# Patient Record
Sex: FEMALE | Race: BLACK OR AFRICAN AMERICAN
[De-identification: names, ages, dates, MRNs, and addresses within clinical notes are randomized per-mention and may not be internally consistent; named-entity substitution may affect disease eponyms.]

---

## 2017-08-21 ENCOUNTER — HOSPITAL ENCOUNTER (EMERGENCY)
Dept: HOSPITAL 74 - JERFT | Age: 62
Discharge: HOME | End: 2017-08-21
Payer: COMMERCIAL

## 2017-08-21 VITALS — BODY MASS INDEX: 31.7 KG/M2

## 2017-08-21 VITALS — SYSTOLIC BLOOD PRESSURE: 177 MMHG | TEMPERATURE: 98.2 F | HEART RATE: 78 BPM | DIASTOLIC BLOOD PRESSURE: 85 MMHG

## 2017-08-21 DIAGNOSIS — B02.9: Primary | ICD-10-CM

## 2017-08-21 DIAGNOSIS — I25.10: ICD-10-CM

## 2017-08-21 DIAGNOSIS — E78.00: ICD-10-CM

## 2017-08-21 DIAGNOSIS — I10: ICD-10-CM

## 2017-08-21 DIAGNOSIS — Z95.5: ICD-10-CM

## 2017-08-21 NOTE — PDOC
History of Present Illness





- General


Chief Complaint: Rash


Stated Complaint: RASH/ POSSIBLE SHINGLES


Time Seen by Provider: 08/21/17 12:25


History Source: Patient


Exam Limitations: No Limitations





- History of Present Illness


Initial Comments: 


08/21/17 13:04


Patient is a 62-year-old female history of COPD, reflux, hypertension, high 

cholesterol and cardiac stents. Patient presents emergency department for 

evaluation of painful rash to left lateral torso and her back. Was seen by 

cardiology today and was started on Valtrex however patient came to the 

emergency department because her primary care doctor was unavailable and she 

was not sure that the cardiologist made the right decision on her diagnosis.





Past Medical History: Denies.  





Allergies: No known allergies





Medications: See medication list





Family History: Non-contributory





Social History: Denies smoking, alcohol use, or IVDU





Review of Systems


GENERAL/CONSTITUTIONAL: No fever or chills. No weakness. No weight change.


HEAD, EYES, EARS, NOSE AND THROAT: No change in vision. No ear pain or 

discharge. No sore throat. 


CARDIOVASCULAR: No chest pain or shortness of breath.


RESPIRATORY: No cough, wheezing, or hemoptysis.


GASTROINTESTINAL: No nausea, vomiting, diarrhea or constipation. No rectal 

bleeding.


GENITOURINARY: No dysuria, frequency, or change in urination.


MUSCULOSKELETAL: No joint or muscle swelling or pain. No neck or back pain.


SKIN AND BREASTS: Vesicular, painful annular rash to left lateral torso 

extending to back, dermatomal pattern


NEUROLOGIC: No headache, vertigo, loss of consciousness, or loss of sensation.


PSYCHIATRIC: No depression or anxiety.


ENDOCRINE: No increased thirst. No abnormal weight change.


HEMATOLOGIC/LYMPHATIC: No anemia, easy bleeding, or history of blood clots.


ALLERGIC/IMMUNOLOGIC: No hives or skin allergy. No latex allergy.





Physical Exam: 


GENERAL: The patient is awake, alert, and fully oriented, in no acute distress.


EYES: Pupils equal, round and reactive to light, extraocular movements intact, 

sclera anicteric, conjunctiva clear.


ENT: Ears normal, nares patent, oropharynx clear without exudates.  Moist 

mucous membranes. No uvula deviation


NECK: Normal range of motion, supple without lymphadenopathy, JVD, or masses.


LUNGS: Breath sounds equal, clear to auscultation bilaterally.  No wheezes, and 

no crackles.


HEART: Regular rate and rhythm, normal S1 and S2 without murmur, rub or gallop.


ABDOMEN: Soft, nontender, normoactive bowel sounds.  No guarding, no rebound.  

No masses. No bruising or abrasions


MUSCULOSKELETAL: Normal range of motion, no edema.  No clubbing or cyanosis. No 

cords, erythema, or tenderness. No CVA Tenderness with fist.


NEUROLOGICAL: Cranial nerves II through XII grossly intact.  Normal speech, 

normal gait.


SKIN: Vesicular, painful annular rash to left lateral torso extending to back, 

dermatomal pattern











Past History





- Past Medical History


Allergies/Adverse Reactions: 


 Allergies











Allergy/AdvReac Type Severity Reaction Status Date / Time


 


Penicillins Allergy Severe Hives Verified 08/21/17 11:57











Home Medications: 


Ambulatory Orders





Clopidogrel Bisulfate [Plavix -] 75 mg PO DAILY 04/11/12 


Furosemide [Lasix -] 40 mg PO DAILY 04/11/12 


Lansoprazole [Prevacid -] 30 mg PO DAILY 04/11/12 


Metoprolol Succinate [Toprol XL -] 0 mg PO BID 04/11/12 


Simvastatin [Zocor -] 20 mg PO DAILY 04/11/12 


Aspirin [ASA -] 81 mg PO DAILY 07/04/12 


Lipase/Protease/Amylase [Creon Dr 24,000 Units Capsule] 1 each PO TID 07/04/12 


Loratadine [Claritin] 0 mg PO PRN 07/04/12 








Cardiac Disorders: Yes


COPD: Yes (H/O BRONCHITIS)


GI Disorders: Yes (ACID REFLUX)


HTN: Yes


Hypercholesterolemia: Yes





- Surgical History


Abdominal Surgery: Yes


Cardiac Surgery: Yes (CARDIAC STENT)


Cholecystectomy: Yes





- Psycho/Social/Smoking Cessation Hx


Anxiety: No


Suicidal Ideation: No


Smoking Status: No


Smoking History: Former smoker


Have you smoked in the past 12 months: No


Number of Cigarettes Smoked Daily: 0


Information on smoking cessation initiated: No


'Breaking Loose' booklet given: 06/04/14


Hx Alcohol Use: No


Drug/Substance Use Hx: No


Substance Use Type: None





*Physical Exam





- Vital Signs


 Last Vital Signs











Temp Pulse Resp BP Pulse Ox


 


 98.2 F   78   18   177/85   100 


 


 08/21/17 11:59  08/21/17 11:59  08/21/17 11:59  08/21/17 11:59  08/21/17 11:59














Medical Decision Making





- Medical Decision Making





08/21/17 13:23


A/P: Administration here for evaluation of painful rash to left lateral torso 

and back. Findings are consistent with shingles, patient was placed on 

appropriate medication confirmed after calling the pharmacy. Patient was on 

Valtrex, topical lidocaine as well as medication for pain. Patient to follow-up 

with PMD as needed instructed patient that if any lesions develop on face aren'

t I to return immediately to ER.





*DC/Admit/Observation/Transfer


Diagnosis at time of Disposition: 


Shingles


Qualifiers:


 Herpes zoster complications: without complications Qualified Code(s): B02.9 - 

Zoster without complications





- Discharge Dispostion


Disposition: HOME


Condition at time of disposition: Good


Admit: No





- Referrals


Referrals: 


Berna Capellan MD [Primary Care Provider] - 





- Patient Instructions


Additional Instructions: 


All of your prescriptions were called in by her primary care doctor for pain 

medication, topical ointment as well as for Valtrex. These are appropriate 

medications to treat her condition, please follow-up with your PMD in 1 week.





If any increased pain, lesions to eyes, or any other concerns return to ER

## 2018-01-23 ENCOUNTER — HOSPITAL ENCOUNTER (EMERGENCY)
Dept: HOSPITAL 74 - JER | Age: 63
Discharge: LEFT BEFORE DISCHARGE | End: 2018-01-23
Payer: COMMERCIAL

## 2018-01-23 VITALS — DIASTOLIC BLOOD PRESSURE: 54 MMHG | HEART RATE: 66 BPM | TEMPERATURE: 98.4 F | SYSTOLIC BLOOD PRESSURE: 118 MMHG

## 2018-01-23 VITALS — BODY MASS INDEX: 32.5 KG/M2

## 2018-01-23 DIAGNOSIS — R10.30: Primary | ICD-10-CM

## 2018-01-23 DIAGNOSIS — I10: ICD-10-CM

## 2018-01-23 DIAGNOSIS — Z87.440: ICD-10-CM

## 2018-01-23 DIAGNOSIS — Z95.5: ICD-10-CM

## 2018-01-23 DIAGNOSIS — I25.10: ICD-10-CM

## 2018-01-23 DIAGNOSIS — K21.9: ICD-10-CM

## 2018-01-23 DIAGNOSIS — Z87.891: ICD-10-CM

## 2018-01-23 LAB
ALBUMIN SERPL-MCNC: 3.7 G/DL (ref 3.4–5)
ALP SERPL-CCNC: 65 U/L (ref 45–117)
ALT SERPL-CCNC: 44 U/L (ref 12–78)
ANION GAP SERPL CALC-SCNC: 7 MMOL/L (ref 8–16)
APPEARANCE UR: CLEAR
AST SERPL-CCNC: 16 U/L (ref 15–37)
BASOPHILS # BLD: 0.4 % (ref 0–2)
BILIRUB SERPL-MCNC: 0.9 MG/DL (ref 0.2–1)
BILIRUB UR STRIP.AUTO-MCNC: NEGATIVE MG/DL
BUN SERPL-MCNC: 18 MG/DL (ref 7–18)
CALCIUM SERPL-MCNC: 9.6 MG/DL (ref 8.5–10.1)
CHLORIDE SERPL-SCNC: 101 MMOL/L (ref 98–107)
CO2 SERPL-SCNC: 31 MMOL/L (ref 21–32)
COLOR UR: (no result)
CREAT SERPL-MCNC: 0.8 MG/DL (ref 0.55–1.02)
DEPRECATED RDW RBC AUTO: 13.5 % (ref 11.6–15.6)
EOSINOPHIL # BLD: 0 % (ref 0–4.5)
GLUCOSE SERPL-MCNC: 107 MG/DL (ref 74–106)
HCT VFR BLD CALC: 42 % (ref 32.4–45.2)
HGB BLD-MCNC: 13 GM/DL (ref 10.7–15.3)
KETONES UR QL STRIP: NEGATIVE
LEUKOCYTE ESTERASE UR QL STRIP.AUTO: NEGATIVE
LIPASE SERPL-CCNC: 168 U/L (ref 73–393)
LYMPHOCYTES # BLD: 8.1 % (ref 8–40)
MCH RBC QN AUTO: 27.8 PG (ref 25.7–33.7)
MCHC RBC AUTO-ENTMCNC: 31 G/DL (ref 32–36)
MCV RBC: 89.9 FL (ref 80–96)
MONOCYTES # BLD AUTO: 4.8 % (ref 3.8–10.2)
NEUTROPHILS # BLD: 86.7 % (ref 42.8–82.8)
NITRITE UR QL STRIP: NEGATIVE
PH UR: 7 [PH] (ref 5–8)
PLATELET # BLD AUTO: 247 K/MM3 (ref 134–434)
PMV BLD: 8.6 FL (ref 7.5–11.1)
POTASSIUM SERPLBLD-SCNC: 4.2 MMOL/L (ref 3.5–5.1)
PROT SERPL-MCNC: 7 G/DL (ref 6.4–8.2)
PROT UR QL STRIP: NEGATIVE
PROT UR QL STRIP: NEGATIVE
RBC # BLD AUTO: 4.68 M/MM3 (ref 3.6–5.2)
RBC # UR STRIP: NEGATIVE /UL
SODIUM SERPL-SCNC: 139 MMOL/L (ref 136–145)
SP GR UR: 1.01 (ref 1–1.03)
UROBILINOGEN UR STRIP-MCNC: NEGATIVE MG/DL (ref 0.2–1)
WBC # BLD AUTO: 11.9 K/MM3 (ref 4–10)

## 2018-01-23 NOTE — PDOC
Rapid Medical Evaluation


Time Seen by Provider: 01/23/18 16:55


Medical Evaluation: 


 Allergies











Allergy/AdvReac Type Severity Reaction Status Date / Time


 


Penicillins Allergy Severe Hives Verified 08/21/17 11:57











01/23/18 16:55


I have performed a brief in-person evaluation of this patient.





The patient presents with a chief complaint of: Lower abd pain since last night 

worse w/ food, unsure if GERD per pt. Also c/o urinary freg x 3 days. H/o 

former smoker, HTN, CAD w/ stent, gastritis/GERD, UTI





Pertinent physical exam findings:stable w/ unremarkable exam





I have ordered the following:labs





The patient will proceed to the ED for further evaluation.


01/23/18 16:58

## 2019-12-07 ENCOUNTER — HOSPITAL ENCOUNTER (EMERGENCY)
Dept: HOSPITAL 74 - JERFT | Age: 64
Discharge: HOME | End: 2019-12-07
Payer: COMMERCIAL

## 2019-12-07 VITALS — DIASTOLIC BLOOD PRESSURE: 71 MMHG | SYSTOLIC BLOOD PRESSURE: 130 MMHG | HEART RATE: 85 BPM | TEMPERATURE: 98 F

## 2019-12-07 VITALS — BODY MASS INDEX: 33.8 KG/M2

## 2019-12-07 DIAGNOSIS — Z88.0: ICD-10-CM

## 2019-12-07 DIAGNOSIS — M25.561: Primary | ICD-10-CM

## 2019-12-07 DIAGNOSIS — E78.00: ICD-10-CM

## 2019-12-07 DIAGNOSIS — K21.9: ICD-10-CM

## 2019-12-07 DIAGNOSIS — Z95.5: ICD-10-CM

## 2019-12-07 DIAGNOSIS — I99.8: ICD-10-CM

## 2019-12-07 DIAGNOSIS — Z88.6: ICD-10-CM

## 2019-12-07 DIAGNOSIS — I51.9: ICD-10-CM

## 2019-12-07 DIAGNOSIS — I10: ICD-10-CM

## 2019-12-07 NOTE — PDOC
History of Present Illness





- General


Chief Complaint: Pain, Acute


Stated Complaint: PAIN


Time Seen by Provider: 12/07/19 12:08


History Source: Patient


Exam Limitations: Clinical Condition





- History of Present Illness


Initial Comments: 





12/07/19 12:27


Patient with past medical history of venous insufficiency being followed up by 

vascular present with complaint of over 4 months history of persistent swelling 

and pain to posterior aspect of right knee with increased swelling over right 

knee.  Patient had duplex ultrasound done 4 months ago which shows venous 

insufficiency with no DVT and was being scheduled for ablation but patient has 

not followed up since then.  Denies numbness or tingling sensation.  Denies 

calf muscle pain, shortness of breath, chest pain, palpitations.  Denies any 

other symptoms


Is this a multiple visit Asthma Patient?: No





Past History





- Past Medical History


Allergies/Adverse Reactions: 


 Allergies











Allergy/AdvReac Type Severity Reaction Status Date / Time


 


Penicillins Allergy Severe Hives Verified 12/07/19 12:06


 


codeine AdvReac Intermediate Nausea Verified 12/07/19 12:06











Home Medications: 


Ambulatory Orders





Clopidogrel Bisulfate [Plavix -] 75 mg PO DAILY 04/11/12 


Furosemide [Lasix -] 40 mg PO DAILY 04/11/12 


Lansoprazole [Prevacid -] 30 mg PO DAILY 04/11/12 


Metoprolol Succinate [Toprol XL -] 0 mg PO BID 04/11/12 


Simvastatin [Zocor -] 20 mg PO DAILY 04/11/12 


Aspirin [ASA -] 81 mg PO DAILY 07/04/12 


Lipase/Protease/Amylase [Creon Dr 24,000 Units Capsule] 1 each PO TID 07/04/12 


Loratadine [Claritin] 0 mg PO PRN 07/04/12 


Nitrofurantoin Monohyd/M-Cryst [Macrobid -] 100 mg PO BID #14 capsule 01/27/18 


Albuterol Sulfate TID 06/24/19 


Calcium 500 mg PO DAILY 06/24/19 


Folic Acid - 1 mg PO DAILY 06/24/19 


Hydrochlorothiazide 12.5 mg PO DAILY 06/24/19 


Lipitor 40 mg PO DAILY 06/24/19 


Potassium Chloride 10 meq PO DAILY 06/24/19 


Spiriva 2 puff PO BID 06/24/19 


Compress.stocking,Knee,Reg,Lrg [Relief Knee Open Toe] 1 each MC DAILY #1 each 12 /07/19 








Anemia: No


Asthma: No


Cancer: No


Cardiac Disorders: Yes


CVA: No


COPD: Yes (H/O BRONCHITIS)


CHF: No


Dementia: No


Diabetes: No


GI Disorders: Yes (ACID REFLUX)


 Disorders: No


HTN: Yes


Hypercholesterolemia: Yes


Liver Disease: No


Seizures: No


Thyroid Disease: No





- Surgical History


Abdominal Surgery: Yes


Appendectomy: No


Cardiac Surgery: Yes (CARDIAC STENT)


Cholecystectomy: Yes


Lung Surgery: No


Neurologic Surgery: No


Orthopedic Surgery: No





- Psycho Social/Smoking Cessation Hx


Smoking Status: No


Smoking History: Unknown if ever smoked


Have you smoked in the past 12 months: No


Number of Cigarettes Smoked Daily: 0


Information on smoking cessation initiated: No


'Breaking Loose' booklet given: 06/04/14


Hx Alcohol Use: No


Drug/Substance Use Hx: No


Substance Use Type: None





**Review of Systems





- Review of Systems


Able to Perform ROS?: Yes


Is the patient limited English proficient: No


Constitutional: No: Fever, Malaise, Weakness


HEENTM: No: Symptoms Reported


Respiratory: No: Symptoms reported, Shortness of Breath, SOB with Exertion, 

Hemoptysis


Cardiac (ROS): No: Symptoms Reported, See HPI, Chest Pain, Edema, Irregular 

Heart Rate, Lightheadedness, Palpitations, Syncope, Chest Tightness, Other


ABD/GI: No: Symptoms Reported


Musculoskeletal: Yes: Symptoms Reported, See HPI, Joint Pain (right knee), 

Joint Swelling (right knee swelling), Muscle Pain (posterior right knee pain)


Integumentary: No: Symptoms Reported, See HPI, Change in Color, Erythema


Neurological: No: Symptoms reported, Numbness, Paresthesia, Tingling


All Other Systems: Reviewed and Negative





*Physical Exam





- Vital Signs


 Last Vital Signs











Temp Pulse Resp BP Pulse Ox


 


 98.0 F   85   16   130/71   100 


 


 12/07/19 12:03  12/07/19 12:03  12/07/19 12:03  12/07/19 12:03  12/07/19 12:03














- Physical Exam


General Appearance: Yes: Nourished, Appropriately Dressed.  No: Apparent 

Distress


HEENT: positive: Normal ENT Inspection


Neck: positive: Supple


Respiratory/Chest: negative: Respiratory Distress, Accessory Muscle Use


Cardiovascular: positive: Regular Rhythm, Regular Rate


Musculoskeletal: positive: Normal Inspection, Other (multiple varices to b/l LE 

mild tenderness over posterior aspect of right knee)


Extremity: positive: Normal Capillary Refill, Normal Range of Motion


Integumentary: positive: Normal Color, Warm, Swelling (mild swelling over 

lateral aspect of right knee).  negative: Other (no increased warmth or 

erythema to right knee)


Neurologic: positive: Fully Oriented, Alert, Normal Response, Motor Strength 5/5





ED Treatment Course





- RADIOLOGY


Radiology Studies Ordered: 














 Category Date Time Status


 


 KNEE 3 POS-RIGHT [RAD] Stat Radiology  12/07/19 12:21 Ordered














Medical Decision Making





- Medical Decision Making





12/07/19 12:29


Patient with past medical history of venous insufficiency being followed up by 

vascular present with complaint of over 4 months history of persistent swelling 

and pain to posterior aspect of right knee with increased swelling over right 

knee.  Patient had duplex ultrasound done 4 months ago which shows venous 

insufficiency with no DVT and was being scheduled for ablation but patient has 

not followed up since then.  Denies numbness or tingling sensation.  Denies 

calf muscle pain, shortness of breath, chest pain, palpitations.  Denies any 

other symptoms


Exam significant for multiple varices to bilateral extremity with increased 

varices to posterior aspect of right lower extremity.  Mild swelling to lateral 

collateral ligament.  No bilateral joint effusion.  Negative ballottement sign.


Patient symptoms likely pain from venous insufficiency.  X-ray of right knee 

ordered to rule out acute pathology


12/07/19 13:16


Right knee x-ray shows no acute pathology.  Patient symptoms edema from venous 

insufficiency.  Patient stable for discharge on compression stockings with 

follow-up back with vascular Dr. Mullins for management of venous insufficiency.





Discharge





- Discharge Information


Problems reviewed: Yes


Clinical Impression/Diagnosis: 


 Venous insufficiency





Right knee pain


Qualifiers:


 Chronicity: chronic Qualified Code(s): M25.561 - Pain in right knee





Condition: Stable


Disposition: HOME





- Admission


No





- Additional Discharge Information


Prescriptions: 


Compress.stocking,Knee,Reg,Lrg [Relief Knee Open Toe] 1 each  DAILY #1 each





- Follow up/Referral


Referrals: 


Sunil Mullins DO [Staff Physician] - 





- Patient Discharge Instructions


Patient Printed Discharge Instructions:  DI for Edema Due to Venous Stasis


Additional Instructions: 


Your x-ray is normal and your swelling is likely caused by venous insufficiency 

as discussed.  Use prescribed compression stockings daily.  Follow-up with 

vascular again to manage venous insufficiency





- Post Discharge Activity

## 2020-08-05 ENCOUNTER — HOSPITAL ENCOUNTER (EMERGENCY)
Dept: HOSPITAL 74 - JER | Age: 65
Discharge: HOME | End: 2020-08-05
Payer: COMMERCIAL

## 2020-08-05 VITALS — BODY MASS INDEX: 32 KG/M2

## 2020-08-05 VITALS — HEART RATE: 57 BPM | SYSTOLIC BLOOD PRESSURE: 163 MMHG | DIASTOLIC BLOOD PRESSURE: 82 MMHG

## 2020-08-05 VITALS — TEMPERATURE: 98.6 F

## 2020-08-05 DIAGNOSIS — T65.91XA: Primary | ICD-10-CM

## 2020-08-05 LAB
ALBUMIN SERPL-MCNC: 4 G/DL (ref 3.4–5)
ALP SERPL-CCNC: 75 U/L (ref 45–117)
ALT SERPL-CCNC: 32 U/L (ref 13–61)
ANION GAP SERPL CALC-SCNC: 4 MMOL/L (ref 8–16)
APPEARANCE UR: CLEAR
APTT BLD: 34.8 SECONDS (ref 25.2–36.5)
AST SERPL-CCNC: 38 U/L (ref 15–37)
BASOPHILS # BLD: 0.3 % (ref 0–2)
BILIRUB SERPL-MCNC: 0.8 MG/DL (ref 0.2–1)
BILIRUB UR STRIP.AUTO-MCNC: NEGATIVE MG/DL
BUN SERPL-MCNC: 17.7 MG/DL (ref 7–18)
CALCIUM SERPL-MCNC: 9.4 MG/DL (ref 8.5–10.1)
CHLORIDE SERPL-SCNC: 104 MMOL/L (ref 98–107)
CO2 SERPL-SCNC: 31 MMOL/L (ref 21–32)
COLOR UR: YELLOW
CREAT SERPL-MCNC: 0.6 MG/DL (ref 0.55–1.3)
DEPRECATED RDW RBC AUTO: 13.2 % (ref 11.6–15.6)
EOSINOPHIL # BLD: 1.4 % (ref 0–4.5)
GLUCOSE SERPL-MCNC: 90 MG/DL (ref 74–106)
HCT VFR BLD CALC: 44.8 % (ref 32.4–45.2)
HGB BLD-MCNC: 14.4 GM/DL (ref 10.7–15.3)
INR BLD: 0.95 (ref 0.83–1.09)
KETONES UR QL STRIP: NEGATIVE
LEUKOCYTE ESTERASE UR QL STRIP.AUTO: NEGATIVE
LIPASE SERPL-CCNC: 131 U/L (ref 73–393)
LYMPHOCYTES # BLD: 21.1 % (ref 8–40)
MCH RBC QN AUTO: 28.6 PG (ref 25.7–33.7)
MCHC RBC AUTO-ENTMCNC: 32 G/DL (ref 32–36)
MCV RBC: 89.4 FL (ref 80–96)
MONOCYTES # BLD AUTO: 10.2 % (ref 3.8–10.2)
NEUTROPHILS # BLD: 67 % (ref 42.8–82.8)
NITRITE UR QL STRIP: NEGATIVE
PH UR: 5.5 [PH] (ref 5–8)
PLATELET # BLD AUTO: 244 K/MM3 (ref 134–434)
PMV BLD: 8.7 FL (ref 7.5–11.1)
POTASSIUM SERPLBLD-SCNC: 4.7 MMOL/L (ref 3.5–5.1)
PROT SERPL-MCNC: 7.4 G/DL (ref 6.4–8.2)
PROT UR QL STRIP: NEGATIVE
PROT UR QL STRIP: NEGATIVE
PT PNL PPP: 11.2 SEC (ref 9.7–13)
RBC # BLD AUTO: 5.02 M/MM3 (ref 3.6–5.2)
SODIUM SERPL-SCNC: 140 MMOL/L (ref 136–145)
SP GR UR: 1.02 (ref 1.01–1.03)
UROBILINOGEN UR STRIP-MCNC: 1 MG/DL (ref 0.2–1)
WBC # BLD AUTO: 5.3 K/MM3 (ref 4–10)

## 2020-08-05 NOTE — PDOC
History of Present Illness





- General


Chief Complaint: Ingestion


Stated Complaint: TOOK WRONG MEDS/TIRED


Time Seen by Provider: 08/05/20 10:45


History Source: Patient


Exam Limitations: No Limitations





- History of Present Illness


Initial Comments: 





08/05/20 11:25


65-year-old female history of CAD status post stent on Plavix and aspirin, 

hypertension, hyperlipidemia, UTI currently on cephalexin presents complaining 

of feeling sleepy and groggy over the past hour.  States she has a daughter at 

home has multiple sclerosis and she accidentally took the wrong medications.  

She took her daughter's medications which was next to her medication.  Usually 

the medications are in 2 separate bags however she  her daughter's 

medicine in one area and her medicine in another area. She ingested gabapentin 

300 mg, Remeron 15 mg 1 tablet, Zoloft 50 mg 1 tablet and Benadryl 25 mg 1 

tablet approximately 1 hour ago.  Denies chest pain, shortness of breath, 

abdominal pain, nausea, vomiting, diarrhea, back pain, suicidal or homicidal 

ideation or any other complaint.





ROS: as above





PE:


GENERAL: well-appearing, NAD


HEAD: NCAT


EYES: Pupils equal, round and reactive to light, sclera anicteric, conjunctiva 

clear


ENT: pharynx: no erythema, no exudate, uvula midline


NECK: supple


CHEST: nontender


RESP: clear, no w/r/r


CARDIO: rrr, no m/g/r


ABD: +BS, soft, nontender, non distended


BACK: no midline spinal ttp, no CVAT 


EXTREMITIES: Normal range of motion, no edema


NEUROLOGICAL: Minimal slurred speech, normal gait


SKIN: Warm, Dry








08/05/20 15:29





08/05/20 15:31





Is this a multiple visit Asthma Patient?: No





Past History





- Medical History


Allergies/Adverse Reactions: 


                                    Allergies











Allergy/AdvReac Type Severity Reaction Status Date / Time


 


Penicillins Allergy Severe Hives Verified 08/05/20 10:34


 


codeine AdvReac Intermediate Nausea Verified 08/05/20 10:34











Home Medications: 


Ambulatory Orders





Clopidogrel Bisulfate [Plavix -] 75 mg PO DAILY 04/11/12 


Furosemide [Lasix -] 40 mg PO DAILY 04/11/12 


Lansoprazole [Prevacid -] 30 mg PO DAILY 04/11/12 


Metoprolol Succinate [Toprol XL -] 0 mg PO BID 04/11/12 


Simvastatin [Zocor -] 20 mg PO DAILY 04/11/12 


Aspirin [ASA -] 81 mg PO DAILY 07/04/12 


Lipase/Protease/Amylase [Creon Dr 24,000 Units Capsule] 1 each PO TID 07/04/12 


Loratadine [Claritin] 0 mg PO PRN 07/04/12 


Nitrofurantoin Monohyd/M-Cryst [Macrobid -] 100 mg PO BID #14 capsule 01/27/18 


Albuterol Sulfate TID 06/24/19 


Calcium 500 mg PO DAILY 06/24/19 


Folic Acid - 1 mg PO DAILY 06/24/19 


Hydrochlorothiazide 12.5 mg PO DAILY 06/24/19 


Lipitor 40 mg PO DAILY 06/24/19 


Potassium Chloride 10 meq PO DAILY 06/24/19 


Spiriva 2 puff PO BID 06/24/19 


Compress.stocking,Knee,Reg,Lrg [Relief Knee Open Toe] 1 each MC DAILY #1 each 

12/07/19 








Anemia: No


Asthma: No


Cancer: No


Cardiac Disorders: Yes


CVA: No


COPD: Yes (H/O BRONCHITIS)


CHF: No


Dementia: No


Diabetes: No


GI Disorders: Yes (ACID REFLUX)


 Disorders: No


HTN: Yes


Hypercholesterolemia: Yes


Liver Disease: No


Seizures: No


Thyroid Disease: No





- Surgical History


Abdominal Surgery: Yes


Appendectomy: No


Cardiac Surgery: Yes (CARDIAC STENT)


Cholecystectomy: Yes


Lung Surgery: No


Neurologic Surgery: No


Orthopedic Surgery: No





- Reproductive History


Is Patient Pregnant Now?: No





- Psycho-Social/Smoking History


Smoking Status: No


Smoking History: Never smoked


Have you smoked in the past 12 months: No


Number of Cigarettes Smoked Daily: 0


Information on smoking cessation initiated: No


'Breaking Loose' booklet given: 06/04/14





- Substance Abuse Hx (Audit-C & DAST Scrn)


How often the patient has a drink containing alcohol: Never


Score: In Men: 4 or > Positive; In Women: 3 or > Positive: 0


Screen Result (Pos requires Nsg. Audit-10AR): Negative


In the last yr the pt used illegal drug/Rx for NonMed reason: No


Score:  Yes response is considered Positive: 0


Screen Result (Positive result requires Nsg. DAST-10): Negative





*Physical Exam





- Vital Signs


                                Last Vital Signs











Temp Pulse Resp BP Pulse Ox


 


 98.6 F   74   16   114/66   100 


 


 08/05/20 10:31  08/05/20 10:31  08/05/20 10:31  08/05/20 10:31  08/05/20 10:31














ED Treatment Course





- LABORATORY


CBC & Chemistry Diagram: 


                                 08/05/20 12:00





                                 08/05/20 12:00





Medical Decision Making





- Medical Decision Making





08/05/20 11:52


65-year-old female history of CAD status post stent on Plavix and aspirin, 

hypertension, hyperlipidemia, UTI currently on cephalexin presents complaining 

of feeling sleepy and groggy over the past hour.  States she has a daughter at 

home has multiple sclerosis and she accidentally took the wrong medications.  

She took her daughter's medications which was next to her medication.  Usually 

the medications are in 2 separate bags however she  her daughter's 

medicine in one area and her medicine in another area. She ingested gabapentin 

300 mg, Remeron 15 mg 1 tablet, Zoloft 50 mg 1 tablet and Benadryl 25 mg 1 

tablet approximately 1 hour ago.  Denies chest pain, shortness of breath, 

abdominal pain, nausea, vomiting, diarrhea, back pain, suicidal or homicidal or 

any other complaint.





CBC, CMP, PT/PTT


EKG


Chest x-ray


IV fluids


Consult poison control


Reassess





08/05/20 15:25


spoke with Dr. Mayo from poison control at 12:10 pm who agreed with above plan, 

advised to observe 6 hours from time of ingestion


Reviewed labs


ecg: HR 93, nsr, no st or tw changes


Patient slept comfortably in the ED and now feels less sleepy


Patient denies nausea, vomiting, chest pain, shortness of breath or any 

complaints


No concerning events in the ED


Patient agrees with discharge plan


She she agrees to find a different system such as getting different color-coded 

pillboxes in order to avoid any confusion at home


Stable for discharge


08/05/20 15:29





08/05/20 15:30





08/05/20 15:32








Discharge





- Discharge Information


Problems reviewed: Yes


Clinical Impression/Diagnosis: 


Accidental ingestion of substance


Qualifiers:


 Encounter type: initial encounter Qualified Code(s): T65.91XA - Toxic effect of

unspecified substance, accidental (unintentional), initial encounter





Condition: Stable


Disposition: HOME





- Admission


No





- Follow up/Referral


Referrals: 


Vandana Mcdonough NP [Primary Care Provider] - 





- Patient Discharge Instructions


Additional Instructions: 


Make sure you take only your medications as prescribed


Label all medications at home appropriately


If you develop chest pain, shortness of breath, dizziness, vomiting, abdominal 

pain or any concerning symptom return to ED








- Post Discharge Activity

## 2020-08-06 NOTE — EKG
Test Reason : 

Blood Pressure : ***/*** mmHG

Vent. Rate : 059 BPM     Atrial Rate : 059 BPM

   P-R Int : 184 ms          QRS Dur : 090 ms

    QT Int : 426 ms       P-R-T Axes : 052 026 024 degrees

   QTc Int : 421 ms

 

SINUS BRADYCARDIA

OTHERWISE NORMAL ECG

WHEN COMPARED WITH ECG OF 04-JUL-2012 17:13,

NO SIGNIFICANT CHANGE WAS FOUND

Confirmed by SHELBIE HARTMAN MD ([id number]) on 8/6/2020 2:50:21 PM

 

Referred By:             Confirmed By:SHELBIE HARTMAN MD

## 2022-05-14 ENCOUNTER — HOSPITAL ENCOUNTER (INPATIENT)
Dept: HOSPITAL 74 - JER | Age: 67
LOS: 4 days | Discharge: HOME | DRG: 394 | End: 2022-05-18
Attending: INTERNAL MEDICINE | Admitting: INTERNAL MEDICINE
Payer: COMMERCIAL

## 2022-05-14 VITALS — BODY MASS INDEX: 33.1 KG/M2

## 2022-05-14 DIAGNOSIS — K55.9: Primary | ICD-10-CM

## 2022-05-14 DIAGNOSIS — K62.5: ICD-10-CM

## 2022-05-14 DIAGNOSIS — E78.5: ICD-10-CM

## 2022-05-14 DIAGNOSIS — I87.2: ICD-10-CM

## 2022-05-14 DIAGNOSIS — I50.32: ICD-10-CM

## 2022-05-14 DIAGNOSIS — I25.10: ICD-10-CM

## 2022-05-14 DIAGNOSIS — I11.0: ICD-10-CM

## 2022-05-14 DIAGNOSIS — N39.0: ICD-10-CM

## 2022-05-14 LAB
ALBUMIN SERPL-MCNC: 3.8 G/DL (ref 3.4–5)
ALP SERPL-CCNC: 75 U/L (ref 45–117)
ALT SERPL-CCNC: 35 U/L (ref 13–61)
ANION GAP SERPL CALC-SCNC: 9 MMOL/L (ref 8–16)
APPEARANCE UR: CLEAR
APTT BLD: 31.1 SECONDS (ref 25.2–36.5)
AST SERPL-CCNC: 17 U/L (ref 15–37)
BACTERIA # UR AUTO: 581 /UL (ref 0–1359)
BASOPHILS # BLD: 0.2 % (ref 0–2)
BILIRUB SERPL-MCNC: 0.8 MG/DL (ref 0.2–1)
BILIRUB UR STRIP.AUTO-MCNC: NEGATIVE MG/DL
BUN SERPL-MCNC: 20.1 MG/DL (ref 7–18)
CALCIUM SERPL-MCNC: 9.1 MG/DL (ref 8.5–10.1)
CASTS URNS QL MICRO: 1 /UL (ref 0–3.1)
CHLORIDE SERPL-SCNC: 98 MMOL/L (ref 98–107)
CO2 SERPL-SCNC: 31 MMOL/L (ref 21–32)
COLOR UR: YELLOW
CREAT SERPL-MCNC: 0.8 MG/DL (ref 0.55–1.3)
DEPRECATED RDW RBC AUTO: 13 % (ref 11.6–15.6)
EOSINOPHIL # BLD: 0.1 % (ref 0–4.5)
EPITH CASTS URNS QL MICRO: 18 /UL (ref 0–25.1)
GLUCOSE SERPL-MCNC: 163 MG/DL (ref 74–106)
HCT VFR BLD CALC: 41.2 % (ref 32.4–45.2)
HGB BLD-MCNC: 13.4 GM/DL (ref 10.7–15.3)
INR BLD: 1.05 (ref 0.83–1.09)
KETONES UR QL STRIP: NEGATIVE
LACTATE SERPL-MCNC: 2.9 MMOL/L (ref 0.4–2)
LACTATE SERPL-MCNC: 3.4 MMOL/L (ref 0.4–2)
LEUKOCYTE ESTERASE UR QL STRIP.AUTO: (no result)
LIPASE SERPL-CCNC: 116 U/L (ref 73–393)
LYMPHOCYTES # BLD: 5.2 % (ref 8–40)
MAGNESIUM SERPL-MCNC: 2.2 MG/DL (ref 1.8–2.4)
MCH RBC QN AUTO: 29 PG (ref 25.7–33.7)
MCHC RBC AUTO-ENTMCNC: 32.5 G/DL (ref 32–36)
MCV RBC: 89.4 FL (ref 80–96)
MONOCYTES # BLD AUTO: 5.5 % (ref 3.8–10.2)
NEUTROPHILS # BLD: 89 % (ref 42.8–82.8)
NITRITE UR QL STRIP: NEGATIVE
PH UR: 5.5 [PH] (ref 5–8)
PHOSPHATE SERPL-MCNC: 4 MG/DL (ref 2.5–4.9)
PLATELET # BLD AUTO: 255 10^3/UL (ref 134–434)
PMV BLD: 7.5 FL (ref 7.5–11.1)
PROT SERPL-MCNC: 7.1 G/DL (ref 6.4–8.2)
PROT UR QL STRIP: NEGATIVE
PROT UR QL STRIP: NEGATIVE
PT PNL PPP: 12.1 SEC (ref 9.7–13)
RBC # BLD AUTO: 4.62 M/MM3 (ref 3.6–5.2)
RBC # BLD AUTO: 70 /UL (ref 0–23.9)
SODIUM SERPL-SCNC: 138 MMOL/L (ref 136–145)
SP GR UR: 1.03 (ref 1.01–1.03)
UROBILINOGEN UR STRIP-MCNC: 1 MG/DL (ref 0.2–1)
WBC # BLD AUTO: 12.2 K/MM3 (ref 4–10)
WBC # UR AUTO: 5 /UL (ref 0–25.8)
YEAST BUDDING URNS QL: NEGATIVE

## 2022-05-14 PROCEDURE — U0003 INFECTIOUS AGENT DETECTION BY NUCLEIC ACID (DNA OR RNA); SEVERE ACUTE RESPIRATORY SYNDROME CORONAVIRUS 2 (SARS-COV-2) (CORONAVIRUS DISEASE [COVID-19]), AMPLIFIED PROBE TECHNIQUE, MAKING USE OF HIGH THROUGHPUT TECHNOLOGIES AS DESCRIBED BY CMS-2020-01-R: HCPCS

## 2022-05-14 PROCEDURE — U0005 INFEC AGEN DETEC AMPLI PROBE: HCPCS

## 2022-05-14 RX ADMIN — ONDANSETRON PRN MG: 2 INJECTION INTRAMUSCULAR; INTRAVENOUS at 17:40

## 2022-05-14 RX ADMIN — POTASSIUM CHLORIDE, DEXTROSE MONOHYDRATE AND SODIUM CHLORIDE SCH MLS/HR: 150; 5; 450 INJECTION, SOLUTION INTRAVENOUS at 14:57

## 2022-05-15 LAB
ALBUMIN SERPL-MCNC: 3 G/DL (ref 3.4–5)
ALP SERPL-CCNC: 65 U/L (ref 45–117)
ALT SERPL-CCNC: 25 U/L (ref 13–61)
ANION GAP SERPL CALC-SCNC: 7 MMOL/L (ref 8–16)
AST SERPL-CCNC: 13 U/L (ref 15–37)
BASOPHILS # BLD: 0.1 % (ref 0–2)
BILIRUB SERPL-MCNC: 1.2 MG/DL (ref 0.2–1)
BUN SERPL-MCNC: 7.4 MG/DL (ref 7–18)
CALCIUM SERPL-MCNC: 8.5 MG/DL (ref 8.5–10.1)
CHLORIDE SERPL-SCNC: 105 MMOL/L (ref 98–107)
CO2 SERPL-SCNC: 29 MMOL/L (ref 21–32)
CREAT SERPL-MCNC: 0.7 MG/DL (ref 0.55–1.3)
DEPRECATED RDW RBC AUTO: 13.2 % (ref 11.6–15.6)
EOSINOPHIL # BLD: 0.6 % (ref 0–4.5)
GLUCOSE SERPL-MCNC: 115 MG/DL (ref 74–106)
HCT VFR BLD CALC: 41.5 % (ref 32.4–45.2)
HGB BLD-MCNC: 13.8 GM/DL (ref 10.7–15.3)
LYMPHOCYTES # BLD: 15.3 % (ref 8–40)
MCH RBC QN AUTO: 30 PG (ref 25.7–33.7)
MCHC RBC AUTO-ENTMCNC: 33.4 G/DL (ref 32–36)
MCV RBC: 89.8 FL (ref 80–96)
MONOCYTES # BLD AUTO: 7.3 % (ref 3.8–10.2)
NEUTROPHILS # BLD: 76.7 % (ref 42.8–82.8)
PLATELET # BLD AUTO: 241 10^3/UL (ref 134–434)
PMV BLD: 7.8 FL (ref 7.5–11.1)
PROT SERPL-MCNC: 6.1 G/DL (ref 6.4–8.2)
RBC # BLD AUTO: 4.62 M/MM3 (ref 3.6–5.2)
SODIUM SERPL-SCNC: 140 MMOL/L (ref 136–145)
WBC # BLD AUTO: 9.3 K/MM3 (ref 4–10)

## 2022-05-15 RX ADMIN — TIOTROPIUM BROMIDE INHALATION SPRAY SCH PUFF: 3.12 SPRAY, METERED RESPIRATORY (INHALATION) at 10:20

## 2022-05-15 RX ADMIN — FAMOTIDINE SCH MG: 10 INJECTION, SOLUTION INTRAVENOUS at 11:52

## 2022-05-15 RX ADMIN — ONDANSETRON PRN MG: 2 INJECTION INTRAMUSCULAR; INTRAVENOUS at 07:23

## 2022-05-15 RX ADMIN — ALBUTEROL SULFATE PRN PUFF: 90 AEROSOL, METERED RESPIRATORY (INHALATION) at 10:27

## 2022-05-15 RX ADMIN — POTASSIUM CHLORIDE, DEXTROSE MONOHYDRATE AND SODIUM CHLORIDE SCH: 150; 5; 450 INJECTION, SOLUTION INTRAVENOUS at 11:53

## 2022-05-15 RX ADMIN — FAMOTIDINE SCH MG: 10 INJECTION, SOLUTION INTRAVENOUS at 21:48

## 2022-05-15 RX ADMIN — POTASSIUM CHLORIDE, DEXTROSE MONOHYDRATE AND SODIUM CHLORIDE SCH MLS/HR: 150; 5; 450 INJECTION, SOLUTION INTRAVENOUS at 03:05

## 2022-05-16 LAB
ALBUMIN SERPL-MCNC: 2.6 G/DL (ref 3.4–5)
ALP SERPL-CCNC: 57 U/L (ref 45–117)
ALT SERPL-CCNC: 20 U/L (ref 13–61)
ANION GAP SERPL CALC-SCNC: 6 MMOL/L (ref 8–16)
AST SERPL-CCNC: 13 U/L (ref 15–37)
BILIRUB SERPL-MCNC: 1 MG/DL (ref 0.2–1)
BUN SERPL-MCNC: 7 MG/DL (ref 7–18)
CALCIUM SERPL-MCNC: 8.1 MG/DL (ref 8.5–10.1)
CHLORIDE SERPL-SCNC: 106 MMOL/L (ref 98–107)
CO2 SERPL-SCNC: 29 MMOL/L (ref 21–32)
CREAT SERPL-MCNC: 0.5 MG/DL (ref 0.55–1.3)
DEPRECATED RDW RBC AUTO: 12.8 % (ref 11.6–15.6)
GLUCOSE SERPL-MCNC: 109 MG/DL (ref 74–106)
HCT VFR BLD CALC: 37.8 % (ref 32.4–45.2)
HGB BLD-MCNC: 12.2 GM/DL (ref 10.7–15.3)
MCH RBC QN AUTO: 29.1 PG (ref 25.7–33.7)
MCHC RBC AUTO-ENTMCNC: 32.4 G/DL (ref 32–36)
MCV RBC: 89.8 FL (ref 80–96)
PLATELET # BLD AUTO: 218 10^3/UL (ref 134–434)
PMV BLD: 8 FL (ref 7.5–11.1)
PROT SERPL-MCNC: 5.3 G/DL (ref 6.4–8.2)
RBC # BLD AUTO: 4.21 M/MM3 (ref 3.6–5.2)
SODIUM SERPL-SCNC: 141 MMOL/L (ref 136–145)
WBC # BLD AUTO: 8.3 K/MM3 (ref 4–10)

## 2022-05-16 RX ADMIN — POTASSIUM CHLORIDE, DEXTROSE MONOHYDRATE AND SODIUM CHLORIDE SCH: 150; 5; 450 INJECTION, SOLUTION INTRAVENOUS at 11:53

## 2022-05-16 RX ADMIN — POTASSIUM CHLORIDE, DEXTROSE MONOHYDRATE AND SODIUM CHLORIDE SCH MLS/HR: 150; 5; 450 INJECTION, SOLUTION INTRAVENOUS at 23:43

## 2022-05-16 RX ADMIN — TIOTROPIUM BROMIDE INHALATION SPRAY SCH PUFF: 3.12 SPRAY, METERED RESPIRATORY (INHALATION) at 09:39

## 2022-05-16 RX ADMIN — FAMOTIDINE SCH MG: 20 TABLET ORAL at 21:49

## 2022-05-16 RX ADMIN — POTASSIUM CHLORIDE, DEXTROSE MONOHYDRATE AND SODIUM CHLORIDE SCH MLS/HR: 150; 5; 450 INJECTION, SOLUTION INTRAVENOUS at 06:17

## 2022-05-16 RX ADMIN — FAMOTIDINE SCH MG: 10 INJECTION, SOLUTION INTRAVENOUS at 10:48

## 2022-05-17 LAB
ALBUMIN SERPL-MCNC: 2.8 G/DL (ref 3.4–5)
ALP SERPL-CCNC: 61 U/L (ref 45–117)
ALT SERPL-CCNC: 23 U/L (ref 13–61)
ANION GAP SERPL CALC-SCNC: 5 MMOL/L (ref 8–16)
AST SERPL-CCNC: 15 U/L (ref 15–37)
BASOPHILS # BLD: 0.2 % (ref 0–2)
BILIRUB SERPL-MCNC: 1.2 MG/DL (ref 0.2–1)
BUN SERPL-MCNC: 8 MG/DL (ref 7–18)
CALCIUM SERPL-MCNC: 8.4 MG/DL (ref 8.5–10.1)
CHLORIDE SERPL-SCNC: 107 MMOL/L (ref 98–107)
CO2 SERPL-SCNC: 30 MMOL/L (ref 21–32)
CREAT SERPL-MCNC: 0.5 MG/DL (ref 0.55–1.3)
DEPRECATED RDW RBC AUTO: 13 % (ref 11.6–15.6)
EOSINOPHIL # BLD: 1.6 % (ref 0–4.5)
GLUCOSE SERPL-MCNC: 90 MG/DL (ref 74–106)
HCT VFR BLD CALC: 39.8 % (ref 32.4–45.2)
HGB BLD-MCNC: 12.7 GM/DL (ref 10.7–15.3)
LYMPHOCYTES # BLD: 19.9 % (ref 8–40)
MAGNESIUM SERPL-MCNC: 2.2 MG/DL (ref 1.8–2.4)
MCH RBC QN AUTO: 28.7 PG (ref 25.7–33.7)
MCHC RBC AUTO-ENTMCNC: 31.8 G/DL (ref 32–36)
MCV RBC: 90.4 FL (ref 80–96)
MONOCYTES # BLD AUTO: 6.8 % (ref 3.8–10.2)
NEUTROPHILS # BLD: 71.5 % (ref 42.8–82.8)
PHOSPHATE SERPL-MCNC: 2.8 MG/DL (ref 2.5–4.9)
PLATELET # BLD AUTO: 246 10^3/UL (ref 134–434)
PMV BLD: 8 FL (ref 7.5–11.1)
PROT SERPL-MCNC: 5.8 G/DL (ref 6.4–8.2)
RBC # BLD AUTO: 4.41 M/MM3 (ref 3.6–5.2)
SODIUM SERPL-SCNC: 142 MMOL/L (ref 136–145)
WBC # BLD AUTO: 6.3 K/MM3 (ref 4–10)

## 2022-05-17 RX ADMIN — PANCRELIPASE SCH: 30000; 6000; 19000 CAPSULE, DELAYED RELEASE PELLETS ORAL at 17:39

## 2022-05-17 RX ADMIN — TIOTROPIUM BROMIDE INHALATION SPRAY SCH PUFF: 3.12 SPRAY, METERED RESPIRATORY (INHALATION) at 09:03

## 2022-05-17 RX ADMIN — MULTIVITAMIN TABLET SCH TAB: TABLET at 09:02

## 2022-05-17 RX ADMIN — FAMOTIDINE SCH: 20 TABLET ORAL at 09:05

## 2022-05-17 RX ADMIN — ASPIRIN 81 MG SCH MG: 81 TABLET ORAL at 09:02

## 2022-05-17 RX ADMIN — LISINOPRIL SCH: 5 TABLET ORAL at 12:19

## 2022-05-17 RX ADMIN — POTASSIUM CHLORIDE, DEXTROSE MONOHYDRATE AND SODIUM CHLORIDE SCH: 150; 5; 450 INJECTION, SOLUTION INTRAVENOUS at 17:38

## 2022-05-18 VITALS — DIASTOLIC BLOOD PRESSURE: 62 MMHG | TEMPERATURE: 98.2 F | HEART RATE: 67 BPM | SYSTOLIC BLOOD PRESSURE: 135 MMHG

## 2022-05-18 LAB
ALBUMIN SERPL-MCNC: 2.9 G/DL (ref 3.4–5)
ALP SERPL-CCNC: 62 U/L (ref 45–117)
ALT SERPL-CCNC: 23 U/L (ref 13–61)
ANION GAP SERPL CALC-SCNC: 8 MMOL/L (ref 8–16)
AST SERPL-CCNC: 21 U/L (ref 15–37)
BILIRUB SERPL-MCNC: 1.3 MG/DL (ref 0.2–1)
BUN SERPL-MCNC: 8.6 MG/DL (ref 7–18)
CALCIUM SERPL-MCNC: 8.6 MG/DL (ref 8.5–10.1)
CHLORIDE SERPL-SCNC: 106 MMOL/L (ref 98–107)
CO2 SERPL-SCNC: 28 MMOL/L (ref 21–32)
CREAT SERPL-MCNC: 0.6 MG/DL (ref 0.55–1.3)
DEPRECATED RDW RBC AUTO: 12.8 % (ref 11.6–15.6)
GLUCOSE SERPL-MCNC: 96 MG/DL (ref 74–106)
HCT VFR BLD CALC: 38.1 % (ref 32.4–45.2)
HGB BLD-MCNC: 12.7 GM/DL (ref 10.7–15.3)
MAGNESIUM SERPL-MCNC: 2.1 MG/DL (ref 1.8–2.4)
MCH RBC QN AUTO: 29.8 PG (ref 25.7–33.7)
MCHC RBC AUTO-ENTMCNC: 33.3 G/DL (ref 32–36)
MCV RBC: 89.4 FL (ref 80–96)
PHOSPHATE SERPL-MCNC: 3.1 MG/DL (ref 2.5–4.9)
PLATELET # BLD AUTO: 241 10^3/UL (ref 134–434)
PMV BLD: 8 FL (ref 7.5–11.1)
PROT SERPL-MCNC: 6.1 G/DL (ref 6.4–8.2)
RBC # BLD AUTO: 4.26 M/MM3 (ref 3.6–5.2)
SODIUM SERPL-SCNC: 142 MMOL/L (ref 136–145)
WBC # BLD AUTO: 5.5 K/MM3 (ref 4–10)

## 2022-05-18 RX ADMIN — MULTIVITAMIN TABLET SCH TAB: TABLET at 10:50

## 2022-05-18 RX ADMIN — PANCRELIPASE SCH: 30000; 6000; 19000 CAPSULE, DELAYED RELEASE PELLETS ORAL at 08:58

## 2022-05-18 RX ADMIN — FAMOTIDINE SCH MG: 20 TABLET ORAL at 00:18

## 2022-05-18 RX ADMIN — PANCRELIPASE SCH: 30000; 6000; 19000 CAPSULE, DELAYED RELEASE PELLETS ORAL at 16:30

## 2022-05-18 RX ADMIN — FAMOTIDINE SCH: 20 TABLET ORAL at 00:22

## 2022-05-18 RX ADMIN — TIOTROPIUM BROMIDE INHALATION SPRAY SCH PUFF: 3.12 SPRAY, METERED RESPIRATORY (INHALATION) at 10:50

## 2022-05-18 RX ADMIN — ALBUTEROL SULFATE PRN PUFF: 90 AEROSOL, METERED RESPIRATORY (INHALATION) at 10:51

## 2022-05-18 RX ADMIN — LISINOPRIL SCH: 5 TABLET ORAL at 10:40

## 2022-05-18 RX ADMIN — FAMOTIDINE SCH MG: 20 TABLET ORAL at 10:50

## 2022-05-18 RX ADMIN — ASPIRIN 81 MG SCH MG: 81 TABLET ORAL at 10:50

## 2022-05-18 RX ADMIN — FAMOTIDINE SCH: 20 TABLET ORAL at 08:58

## 2022-05-18 RX ADMIN — PANCRELIPASE SCH: 30000; 6000; 19000 CAPSULE, DELAYED RELEASE PELLETS ORAL at 11:01

## 2022-06-03 ENCOUNTER — HOSPITAL ENCOUNTER (EMERGENCY)
Dept: HOSPITAL 74 - JER | Age: 67
Discharge: HOME | End: 2022-06-03
Payer: COMMERCIAL

## 2022-06-03 VITALS — TEMPERATURE: 97.9 F | HEART RATE: 69 BPM | SYSTOLIC BLOOD PRESSURE: 111 MMHG | DIASTOLIC BLOOD PRESSURE: 78 MMHG

## 2022-06-03 VITALS — BODY MASS INDEX: 31.7 KG/M2

## 2022-06-03 DIAGNOSIS — N30.00: Primary | ICD-10-CM

## 2022-06-03 LAB
APPEARANCE UR: CLEAR
BACTERIA # UR AUTO: 4789 /UL (ref 0–1359)
BILIRUB UR STRIP.AUTO-MCNC: NEGATIVE MG/DL
CASTS URNS QL MICRO: 0 /UL (ref 0–3.1)
COLOR UR: YELLOW
EPITH CASTS URNS QL MICRO: 16 /UL (ref 0–25.1)
KETONES UR QL STRIP: NEGATIVE
LEUKOCYTE ESTERASE UR QL STRIP.AUTO: (no result)
NITRITE UR QL STRIP: NEGATIVE
PH UR: 7.5 [PH] (ref 5–8)
PROT UR QL STRIP: NEGATIVE
PROT UR QL STRIP: NEGATIVE
RBC # BLD AUTO: 1 /UL (ref 0–23.9)
SP GR UR: 1.01 (ref 1.01–1.03)
UROBILINOGEN UR STRIP-MCNC: 0.2 MG/DL (ref 0.2–1)
WBC # UR AUTO: 52 /UL (ref 0–25.8)

## 2022-07-25 ENCOUNTER — HOSPITAL ENCOUNTER (EMERGENCY)
Dept: HOSPITAL 74 - JERFT | Age: 67
Discharge: HOME | End: 2022-07-25
Payer: COMMERCIAL

## 2022-07-25 VITALS
HEART RATE: 60 BPM | SYSTOLIC BLOOD PRESSURE: 111 MMHG | DIASTOLIC BLOOD PRESSURE: 74 MMHG | TEMPERATURE: 98.2 F | RESPIRATION RATE: 18 BRPM

## 2022-07-25 VITALS — BODY MASS INDEX: 30 KG/M2

## 2022-07-25 DIAGNOSIS — M25.562: Primary | ICD-10-CM

## 2022-11-09 ENCOUNTER — HOSPITAL ENCOUNTER (OUTPATIENT)
Dept: HOSPITAL 74 - FASU-ENDO | Age: 67
Discharge: HOME | End: 2022-11-09
Attending: INTERNAL MEDICINE
Payer: COMMERCIAL

## 2022-11-09 VITALS — TEMPERATURE: 97.8 F | RESPIRATION RATE: 18 BRPM

## 2022-11-09 VITALS — DIASTOLIC BLOOD PRESSURE: 74 MMHG | SYSTOLIC BLOOD PRESSURE: 131 MMHG | HEART RATE: 74 BPM

## 2022-11-09 VITALS — BODY MASS INDEX: 30.2 KG/M2

## 2022-11-09 DIAGNOSIS — K64.1: ICD-10-CM

## 2022-11-09 DIAGNOSIS — K63.89: ICD-10-CM

## 2022-11-09 DIAGNOSIS — R19.7: ICD-10-CM

## 2022-11-09 DIAGNOSIS — K57.30: Primary | ICD-10-CM

## 2022-11-09 PROCEDURE — 0DBP8ZX EXCISION OF RECTUM, VIA NATURAL OR ARTIFICIAL OPENING ENDOSCOPIC, DIAGNOSTIC: ICD-10-PCS | Performed by: INTERNAL MEDICINE

## 2022-11-09 PROCEDURE — 0DBM8ZX EXCISION OF DESCENDING COLON, VIA NATURAL OR ARTIFICIAL OPENING ENDOSCOPIC, DIAGNOSTIC: ICD-10-PCS | Performed by: INTERNAL MEDICINE

## 2022-11-09 PROCEDURE — 0DBK8ZX EXCISION OF ASCENDING COLON, VIA NATURAL OR ARTIFICIAL OPENING ENDOSCOPIC, DIAGNOSTIC: ICD-10-PCS | Performed by: INTERNAL MEDICINE

## 2022-11-09 PROCEDURE — 0DBL8ZX EXCISION OF TRANSVERSE COLON, VIA NATURAL OR ARTIFICIAL OPENING ENDOSCOPIC, DIAGNOSTIC: ICD-10-PCS | Performed by: INTERNAL MEDICINE

## 2023-02-08 ENCOUNTER — HOSPITAL ENCOUNTER (OUTPATIENT)
Dept: HOSPITAL 74 - FASU-ENDO | Age: 68
Discharge: HOME | End: 2023-02-08
Attending: INTERNAL MEDICINE
Payer: COMMERCIAL

## 2023-02-08 VITALS — RESPIRATION RATE: 16 BRPM | TEMPERATURE: 98 F

## 2023-02-08 VITALS — DIASTOLIC BLOOD PRESSURE: 76 MMHG | SYSTOLIC BLOOD PRESSURE: 129 MMHG | HEART RATE: 65 BPM

## 2023-02-08 VITALS — BODY MASS INDEX: 30.5 KG/M2

## 2023-02-08 DIAGNOSIS — K29.50: Primary | ICD-10-CM

## 2023-02-08 DIAGNOSIS — K64.1: ICD-10-CM

## 2023-02-08 DIAGNOSIS — K44.9: ICD-10-CM

## 2023-02-08 DIAGNOSIS — R19.7: ICD-10-CM

## 2023-02-08 DIAGNOSIS — K57.30: ICD-10-CM

## 2023-02-08 PROCEDURE — 0DBM8ZX EXCISION OF DESCENDING COLON, VIA NATURAL OR ARTIFICIAL OPENING ENDOSCOPIC, DIAGNOSTIC: ICD-10-PCS | Performed by: INTERNAL MEDICINE

## 2023-02-08 PROCEDURE — 0DBK8ZX EXCISION OF ASCENDING COLON, VIA NATURAL OR ARTIFICIAL OPENING ENDOSCOPIC, DIAGNOSTIC: ICD-10-PCS | Performed by: INTERNAL MEDICINE

## 2023-02-08 PROCEDURE — 0DB68ZX EXCISION OF STOMACH, VIA NATURAL OR ARTIFICIAL OPENING ENDOSCOPIC, DIAGNOSTIC: ICD-10-PCS | Performed by: INTERNAL MEDICINE

## 2023-02-08 PROCEDURE — 0DB98ZX EXCISION OF DUODENUM, VIA NATURAL OR ARTIFICIAL OPENING ENDOSCOPIC, DIAGNOSTIC: ICD-10-PCS | Performed by: INTERNAL MEDICINE

## 2023-02-08 PROCEDURE — 0DB48ZX EXCISION OF ESOPHAGOGASTRIC JUNCTION, VIA NATURAL OR ARTIFICIAL OPENING ENDOSCOPIC, DIAGNOSTIC: ICD-10-PCS | Performed by: INTERNAL MEDICINE

## 2023-02-08 PROCEDURE — 0DBL8ZX EXCISION OF TRANSVERSE COLON, VIA NATURAL OR ARTIFICIAL OPENING ENDOSCOPIC, DIAGNOSTIC: ICD-10-PCS | Performed by: INTERNAL MEDICINE

## 2023-04-10 ENCOUNTER — HOSPITAL ENCOUNTER (EMERGENCY)
Dept: HOSPITAL 74 - JERFT | Age: 68
Discharge: HOME | End: 2023-04-10
Payer: COMMERCIAL

## 2023-04-10 VITALS
HEART RATE: 75 BPM | SYSTOLIC BLOOD PRESSURE: 104 MMHG | DIASTOLIC BLOOD PRESSURE: 60 MMHG | RESPIRATION RATE: 16 BRPM | TEMPERATURE: 98.2 F

## 2023-04-10 VITALS — BODY MASS INDEX: 29.5 KG/M2

## 2023-04-10 DIAGNOSIS — M25.511: Primary | ICD-10-CM
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
## 2023-04-10 DIAGNOSIS — M25.561: ICD-10-CM

## 2024-07-01 ENCOUNTER — OFFICE (OUTPATIENT)
Dept: URBAN - METROPOLITAN AREA CLINIC 30 | Facility: CLINIC | Age: 69
Setting detail: OPHTHALMOLOGY
End: 2024-07-01
Payer: MEDICARE

## 2024-07-01 DIAGNOSIS — H35.371: ICD-10-CM

## 2024-07-01 DIAGNOSIS — H25.093: ICD-10-CM

## 2024-07-01 DIAGNOSIS — H52.4: ICD-10-CM

## 2024-07-01 PROCEDURE — 92015 DETERMINE REFRACTIVE STATE: CPT | Performed by: OPHTHALMOLOGY

## 2024-07-01 PROCEDURE — 92134 CPTRZ OPH DX IMG PST SGM RTA: CPT | Performed by: OPHTHALMOLOGY

## 2024-07-01 PROCEDURE — 92004 COMPRE OPH EXAM NEW PT 1/>: CPT | Performed by: OPHTHALMOLOGY

## 2024-07-01 ASSESSMENT — CONFRONTATIONAL VISUAL FIELD TEST (CVF)
OS_FINDINGS: FULL
OD_FINDINGS: FULL

## 2025-05-27 ENCOUNTER — HOSPITAL ENCOUNTER (EMERGENCY)
Dept: HOSPITAL 74 - JER | Age: 70
Discharge: HOME | End: 2025-05-27
Payer: COMMERCIAL

## 2025-05-27 VITALS — SYSTOLIC BLOOD PRESSURE: 103 MMHG | HEART RATE: 84 BPM | DIASTOLIC BLOOD PRESSURE: 65 MMHG | TEMPERATURE: 98.3 F

## 2025-05-27 VITALS — RESPIRATION RATE: 16 BRPM | BODY MASS INDEX: 30.7 KG/M2

## 2025-05-27 DIAGNOSIS — E80.6: ICD-10-CM

## 2025-05-27 DIAGNOSIS — R09.3: ICD-10-CM

## 2025-05-27 DIAGNOSIS — R50.9: ICD-10-CM

## 2025-05-27 DIAGNOSIS — R07.89: ICD-10-CM

## 2025-05-27 DIAGNOSIS — R05.1: Primary | ICD-10-CM

## 2025-05-27 LAB
ALBUMIN SERPL-MCNC: 3.5 G/DL (ref 3.4–5)
APTT BLD: 32.9 SECONDS (ref 25.2–36.5)
BASOPHILS # BLD AUTO: 0.01 X10^3/UL (ref 0.01–0.08)
BILIRUB CONJ SERPL-MCNC: 0.3 MG/DL (ref 0–0.2)
BUN SERPL-MCNC: 10.4 MG/DL (ref 7–18)
CALCIUM SERPL-MCNC: 9.7 MG/DL (ref 8.5–10.1)
CREAT SERPL-MCNC: 0.7 MG/DL (ref 0.55–1.3)
EOSINOPHIL # BLD AUTO: 0.29 X10^3/UL (ref 0.04–0.36)
EOSINOPHIL NFR BLD AUTO: 2.7 % (ref 0.7–5.8)
ERYTHROCYTE [DISTWIDTH] IN BLOOD: 12.9 % (ref 12.4–16.4)
HCT VFR BLD CALC: 37.3 % (ref 34.1–44.9)
HGB BLD-MCNC: 11.7 G/DL (ref 11.2–15.7)
IMM GRANULOCYTES # BLD: 0.04 X10^3/UL (ref 0–0.03)
INR BLD: 1.19 (ref 0.83–1.09)
MCHC RBC-ENTMCNC: 31.4 G/DL (ref 32.2–35.5)
MCV RBC: 93.3 FL (ref 79.4–94.8)
MONOCYTES # BLD AUTO: 0.39 X10^3/UL (ref 0.24–0.86)
MONOCYTES NFR BLD AUTO: 3.7 % (ref 4.7–12.5)
PLATELET # BLD AUTO: 265 X10^3/UL (ref 182–369)
PMV BLD: 10.8 FL (ref 9.4–12.3)
POTASSIUM SERPLBLD-SCNC: 4.2 MMOL/L (ref 3.5–5.1)
PROT SERPL-MCNC: 6.7 G/DL (ref 6.4–8.2)

## 2025-05-27 RX ADMIN — ACETAMINOPHEN ONE MG: 500 TABLET, FILM COATED ORAL at 14:15
